# Patient Record
(demographics unavailable — no encounter records)

---

## 2024-10-16 NOTE — HISTORY OF PRESENT ILLNESS
[FreeTextEntry1] : I had the pleasure of seeing your patient Anders Yee today in the arrhythmia clinic of [facility]. As you well know, the patient is a delightful 76 year old [place] speaking gentleman with a history of obesity, chronic obstructive pulmonary disease, hypertension, hyperlipidemia, diabetes, sleep apnea. He had an underlying right bundle branch block with left axis, and had a syncopal episode. He underwent a dual chamber [employer] pacemaker on June 16, 2017 at [place]. He also has a history of paroxysmal atrial fibrillation. On his previous device checks, the patient has been found to have a low burden of atrial fibrillation usually around 5-7%, however some of these episodes last longer with average ventricular rates about 160 bpm. The patient has reported intermittent palpitations as well as shortness of breath when having his palpitations. He also reports occasional lightheadedness as well but denies any syncope. He is currently on metoprolol 100mg daily as well as Eliquis 5mg twice daily. He had been on Coumadin in the past but states he was switched to Eliquis.  A pharmacological stress test performed January 2020 showed a large mild to moderate defect in the inferior wall, inferoseptum and apex that is predominantly fixed consistent with infarct and an LVEF 43%. Nuclear stress test performed on March 8, 2022 demonstrated an EF of 56% and medium sized, mild to moderate defects in inferior, inferoseptal, and inferoapical walls that are fixed consistent with infarct.  He underwent an AF ablation (pulmonary vein isolation and CTI ablation) on July 14, 2021. He is maintained on Toprol and Eliquis. Since then, he reports continued shortness of breath and was diagnosed with COPD by a pulmonologist. He is an active smoker. He has made many attempts to try and quit smoking including nicotine patches and lozenges. Since last follow up March 2024, he has been followed up by pulmonology for his shortness of breath, which he reports has improved a bit, along with being diagnosed with chronic vestibular vertigo. He does report his dizziness has improved.  Today in clinic, Mr. Yee was in good spirits with minimal complaints.  He denies shortness of breath, palpitations, near-syncope or syncope. His blood pressure today is 120/78 with a pulse of 81 bpm and regular. His device interrogation shows normal device function. The atrial lead impedance is 440 ohms with a p wave amplitude of 1.6 mV and a threshold of 0.5 V @ 0.4ms. The RV lead impedance is 550 ohms with a threshold of 0.5V @ 0.4ms and an R wave amplitude of greater than 12 mV. The battery longevity is 3.3-3.5 years. He is V-paced 33% and A-paced 1.8% since June 12, 2023.  His ECG today shows sinus rhythm with ventricular pacing at 81 bpm.

## 2024-10-16 NOTE — DISCUSSION/SUMMARY
[FreeTextEntry1] : In summary, Mr. Yee is a pleasant 77 year-old Citizen of the [place] speaking gentleman with a history of obesity, chronic obstructive pulmonary disease, hypertension, hyperlipidemia, diabetes, sleep apnea, and tachy-geni syndrome status post pacemaker placement. He has paroxysmal atrial fibrillation and underwent a PVI/CTI ablation on July 14, 2021. He remains in sinus rhythm since then with very infrequent brief runs of atrial arrhythmias (8.3% burden since June 2023). He is maintained on Eliquis and metoprolol. We reassured the patient that his symptoms he has been experiencing are not secondary to his pacemaker or arrhythmias. He will follow up in clinic in 6 months. The pacemaker interrogation and ECG were distinct procedures  -Continue Apixaban 5 mg BID -Continue Metoprolol Succinate 50 mg Daily -Follow up in clinic in 6 months  I, Dr. Ponce, personally performed the evaluation and management (E/M) services for this established patient who presents today with his existing conditions. That E/M includes conducting the examination, assessing all existing conditions, and establishing a new plan of care. Today, my fellow, Bob Harrell MD, was here to observe my evaluation and management services for his existing condition to be followed going forward.   [EKG obtained to assist in diagnosis and management of assessed problem(s)] : EKG obtained to assist in diagnosis and management of assessed problem(s)

## 2024-10-16 NOTE — DISCUSSION/SUMMARY
[FreeTextEntry1] : In summary, Mr. Yee is a pleasant 77 year-old [place]ib[place] speaking gentleman with a history of obesity, chronic obstructive pulmonary disease, hypertension, hyperlipidemia, diabetes, sleep apnea, and tachy-geni syndrome status post pacemaker placement. He has paroxysmal atrial fibrillation and underwent a PVI/CTI ablation on July 14, 2021. He remains in sinus rhythm since then with very infrequent brief runs of atrial arrhythmias (8.3% burden since June 2023). He is maintained on Eliquis and metoprolol. We reassured the patient that his symptoms he has been experiencing are not secondary to his pacemaker or arrhythmias. He will follow up in clinic in 6 months. The pacemaker interrogation and ECG were distinct procedures  -Continue Apixaban 5 mg BID -Continue Metoprolol Succinate 50 mg Daily -Follow up in clinic in 6 months  I, Dr. Ponce, personally performed the evaluation and management (E/M) services for this established patient who presents today with his existing conditions. That E/M includes conducting the examination, assessing all existing conditions, and establishing a new plan of care. Today, my fellow, Bob Harrell MD, was here to observe my evaluation and management services for his existing condition to be followed going forward.   [EKG obtained to assist in diagnosis and management of assessed problem(s)] : EKG obtained to assist in diagnosis and management of assessed problem(s)

## 2024-10-16 NOTE — DISCUSSION/SUMMARY
[FreeTextEntry1] : In summary, Mr. Yee is a pleasant 77 year-old Filipino speaking gentleman with a history of obesity, chronic obstructive pulmonary disease, hypertension, hyperlipidemia, diabetes, sleep apnea, and tachy-geni syndrome status post pacemaker placement. He has paroxysmal atrial fibrillation and underwent a PVI/CTI ablation on July 14, 2021. He remains in sinus rhythm since then with very infrequent brief runs of atrial arrhythmias (8.3% burden since June 2023). He is maintained on Eliquis and metoprolol. We reassured the patient that his symptoms he has been experiencing are not secondary to his pacemaker or arrhythmias. He will follow up in clinic in 6 months. The pacemaker interrogation and ECG were distinct procedures  -Continue Apixaban 5 mg BID -Continue Metoprolol Succinate 50 mg Daily -Follow up in clinic in 6 months  I, Dr. Ponce, personally performed the evaluation and management (E/M) services for this established patient who presents today with his existing conditions. That E/M includes conducting the examination, assessing all existing conditions, and establishing a new plan of care. Today, my fellow, Bob Harrell MD, was here to observe my evaluation and management services for his existing condition to be followed going forward.   [EKG obtained to assist in diagnosis and management of assessed problem(s)] : EKG obtained to assist in diagnosis and management of assessed problem(s)

## 2024-10-16 NOTE — HISTORY OF PRESENT ILLNESS
[FreeTextEntry1] : I had the pleasure of seeing your patient Anders Yee today in the arrhythmia clinic of [facility]. As you well know, the patient is a delightful 76 year old Marshallese speaking gentleman with a history of obesity, chronic obstructive pulmonary disease, hypertension, hyperlipidemia, diabetes, sleep apnea. He had an underlying right bundle branch block with left axis, and had a syncopal episode. He underwent a dual chamber [employer] pacemaker on June 16, 2017 at [place]. He also has a history of paroxysmal atrial fibrillation. On his previous device checks, the patient has been found to have a low burden of atrial fibrillation usually around 5-7%, however some of these episodes last longer with average ventricular rates about 160 bpm. The patient has reported intermittent palpitations as well as shortness of breath when having his palpitations. He also reports occasional lightheadedness as well but denies any syncope. He is currently on metoprolol 100mg daily as well as Eliquis 5mg twice daily. He had been on Coumadin in the past but states he was switched to Eliquis.  A pharmacological stress test performed January 2020 showed a large mild to moderate defect in the inferior wall, inferoseptum and apex that is predominantly fixed consistent with infarct and an LVEF 43%. Nuclear stress test performed on March 8, 2022 demonstrated an EF of 56% and medium sized, mild to moderate defects in inferior, inferoseptal, and inferoapical walls that are fixed consistent with infarct.  He underwent an AF ablation (pulmonary vein isolation and CTI ablation) on July 14, 2021. He is maintained on Toprol and Eliquis. Since then, he reports continued shortness of breath and was diagnosed with COPD by a pulmonologist. He is an active smoker. He has made many attempts to try and quit smoking including nicotine patches and lozenges. Since last follow up March 2024, he has been followed up by pulmonology for his shortness of breath, which he reports has improved a bit, along with being diagnosed with chronic vestibular vertigo. He does report his dizziness has improved.  Today in clinic, Mr. Yee was in good spirits with minimal complaints.  He denies shortness of breath, palpitations, near-syncope or syncope. His blood pressure today is 120/78 with a pulse of 81 bpm and regular. His device interrogation shows normal device function. The atrial lead impedance is 440 ohms with a p wave amplitude of 1.6 mV and a threshold of 0.5 V @ 0.4ms. The RV lead impedance is 550 ohms with a threshold of 0.5V @ 0.4ms and an R wave amplitude of greater than 12 mV. The battery longevity is 3.3-3.5 years. He is V-paced 33% and A-paced 1.8% since June 12, 2023.  His ECG today shows sinus rhythm with ventricular pacing at 81 bpm.

## 2024-10-16 NOTE — HISTORY OF PRESENT ILLNESS
[FreeTextEntry1] : I had the pleasure of seeing your patient Anders Yee today in the arrhythmia clinic of [facility]. As you well know, the patient is a delightful 76 year old Monegasque speaking gentleman with a history of obesity, chronic obstructive pulmonary disease, hypertension, hyperlipidemia, diabetes, sleep apnea. He had an underlying right bundle branch block with left axis, and had a syncopal episode. He underwent a dual chamber [employer]em pacemaker on June 16, 2017 at [place]. He also has a history of paroxysmal atrial fibrillation. On his previous device checks, the patient has been found to have a low burden of atrial fibrillation usually around 5-7%, however some of these episodes last longer with average ventricular rates about 160 bpm. The patient has reported intermittent palpitations as well as shortness of breath when having his palpitations. He also reports occasional lightheadedness as well but denies any syncope. He is currently on metoprolol 100mg daily as well as Eliquis 5mg twice daily. He had been on Coumadin in the past but states he was switched to Eliquis.  A pharmacological stress test performed January 2020 showed a large mild to moderate defect in the inferior wall, inferoseptum and apex that is predominantly fixed consistent with infarct and an LVEF 43%. Nuclear stress test performed on March 8, 2022 demonstrated an EF of 56% and medium sized, mild to moderate defects in inferior, inferoseptal, and inferoapical walls that are fixed consistent with infarct.  He underwent an AF ablation (pulmonary vein isolation and CTI ablation) on July 14, 2021. He is maintained on Toprol and Eliquis. Since then, he reports continued shortness of breath and was diagnosed with COPD by a pulmonologist. He is an active smoker. He has made many attempts to try and quit smoking including nicotine patches and lozenges. Since last follow up March 2024, he has been followed up by pulmonology for his shortness of breath, which he reports has improved a bit, along with being diagnosed with chronic vestibular vertigo. He does report his dizziness has improved.  Today in clinic, Mr. Yee was in good spirits with minimal complaints.  He denies shortness of breath, palpitations, near-syncope or syncope. His blood pressure today is 120/78 with a pulse of 81 bpm and regular. His device interrogation shows normal device function. The atrial lead impedance is 440 ohms with a p wave amplitude of 1.6 mV and a threshold of 0.5 V @ 0.4ms. The RV lead impedance is 550 ohms with a threshold of 0.5V @ 0.4ms and an R wave amplitude of greater than 12 mV. The battery longevity is 3.3-3.5 years. He is V-paced 33% and A-paced 1.8% since June 12, 2023.  His ECG today shows sinus rhythm with ventricular pacing at 81 bpm.

## 2024-10-16 NOTE — DISCUSSION/SUMMARY
[FreeTextEntry1] : In summary, Mr. Yee is a pleasant 77 year-old [place]ian speaking gentleman with a history of obesity, chronic obstructive pulmonary disease, hypertension, hyperlipidemia, diabetes, sleep apnea, and tachy-geni syndrome status post pacemaker placement. He has paroxysmal atrial fibrillation and underwent a PVI/CTI ablation on July 14, 2021. He remains in sinus rhythm since then with very infrequent brief runs of atrial arrhythmias (8.3% burden since June 2023). He is maintained on Eliquis and metoprolol. We reassured the patient that his symptoms he has been experiencing are not secondary to his pacemaker or arrhythmias. He will follow up in clinic in 6 months. The pacemaker interrogation and ECG were distinct procedures  -Continue Apixaban 5 mg BID -Continue Metoprolol Succinate 50 mg Daily -Follow up in clinic in 6 months  I, Dr. Ponce, personally performed the evaluation and management (E/M) services for this established patient who presents today with his existing conditions. That E/M includes conducting the examination, assessing all existing conditions, and establishing a new plan of care. Today, my fellow, Bob Harrell MD, was here to observe my evaluation and management services for his existing condition to be followed going forward.   [EKG obtained to assist in diagnosis and management of assessed problem(s)] : EKG obtained to assist in diagnosis and management of assessed problem(s)

## 2024-10-16 NOTE — HISTORY OF PRESENT ILLNESS
[FreeTextEntry1] : I had the pleasure of seeing your patient Anders Yee today in the arrhythmia clinic of [facility]. As you well know, the patient is a delightful 76 year old Chinese speaking gentleman with a history of obesity, chronic obstructive pulmonary disease, hypertension, hyperlipidemia, diabetes, sleep apnea. He had an underlying right bundle branch block with left axis, and had a syncopal episode. He underwent a dual chamber [employer]em pacemaker on June 16, 2017 at [place]. He also has a history of paroxysmal atrial fibrillation. On his previous device checks, the patient has been found to have a low burden of atrial fibrillation usually around 5-7%, however some of these episodes last longer with average ventricular rates about 160 bpm. The patient has reported intermittent palpitations as well as shortness of breath when having his palpitations. He also reports occasional lightheadedness as well but denies any syncope. He is currently on metoprolol 100mg daily as well as Eliquis 5mg twice daily. He had been on Coumadin in the past but states he was switched to Eliquis.  A pharmacological stress test performed January 2020 showed a large mild to moderate defect in the inferior wall, inferoseptum and apex that is predominantly fixed consistent with infarct and an LVEF 43%. Nuclear stress test performed on March 8, 2022 demonstrated an EF of 56% and medium sized, mild to moderate defects in inferior, inferoseptal, and inferoapical walls that are fixed consistent with infarct.  He underwent an AF ablation (pulmonary vein isolation and CTI ablation) on July 14, 2021. He is maintained on Toprol and Eliquis. Since then, he reports continued shortness of breath and was diagnosed with COPD by a pulmonologist. He is an active smoker. He has made many attempts to try and quit smoking including nicotine patches and lozenges. Since last follow up March 2024, he has been followed up by pulmonology for his shortness of breath, which he reports has improved a bit, along with being diagnosed with chronic vestibular vertigo. He does report his dizziness has improved.  Today in clinic, Mr. Yee was in good spirits with minimal complaints.  He denies shortness of breath, palpitations, near-syncope or syncope. His blood pressure today is 120/78 with a pulse of 81 bpm and regular. His device interrogation shows normal device function. The atrial lead impedance is 440 ohms with a p wave amplitude of 1.6 mV and a threshold of 0.5 V @ 0.4ms. The RV lead impedance is 550 ohms with a threshold of 0.5V @ 0.4ms and an R wave amplitude of greater than 12 mV. The battery longevity is 3.3-3.5 years. He is V-paced 33% and A-paced 1.8% since June 12, 2023.  His ECG today shows sinus rhythm with ventricular pacing at 81 bpm.

## 2024-11-05 NOTE — DISCUSSION/SUMMARY
[EKG obtained to assist in diagnosis and management of assessed problem(s)] : EKG obtained to assist in diagnosis and management of assessed problem(s) [FreeTextEntry1] : The patient is a 77-year-old Bulgarian speaking gentleman DM, HTN, HLD,  A-fib PPM, COPD s/p afib ablation with now low BP #1 CV- pharm stress infarct, no ischemia #2 HTN- c/w losartan 100mg, off HCTZ secondary to dizziness, decrease amlodipine to 5mg and if still low then stop altogether, most likely reactive htn at dental surgery.  #3 A-fib- c/w metoprolol 100mg and Eliquis , no evidence atrial tachycardia, normal functioning, s/p afib ablation 7/21 Dr. Ponce, interrogation negative for significant AT today #4 HLD- c/w atorvastatin #5 DM- c/w metformin #6 Pulm- COPD, now on nicoderm patch #7 - s/p cystoscopy, on myrbetriq #8 Dental- There are no cardiac contraindications to dental work under general anesthesia off Eliquis 3 days prior.

## 2024-11-05 NOTE — HISTORY OF PRESENT ILLNESS
[FreeTextEntry1] : Anders is feeling well. When takes HCTZ daily gets dizzy so stopped it. Went for dental work but cancelled because of elevated BP. Today low. Going back on 11/22/24 and needs clearance.

## 2024-11-05 NOTE — DISCUSSION/SUMMARY
[EKG obtained to assist in diagnosis and management of assessed problem(s)] : EKG obtained to assist in diagnosis and management of assessed problem(s) [FreeTextEntry1] : The patient is a 77-year-old Portuguese speaking gentleman DM, HTN, HLD,  A-fib PPM, COPD s/p afib ablation with now low BP #1 CV- pharm stress infarct, no ischemia #2 HTN- c/w losartan 100mg, off HCTZ secondary to dizziness, decrease amlodipine to 5mg and if still low then stop altogether, most likely reactive htn at dental surgery.  #3 A-fib- c/w metoprolol 100mg and Eliquis , no evidence atrial tachycardia, normal functioning, s/p afib ablation 7/21 Dr. Ponce, interrogation negative for significant AT today #4 HLD- c/w atorvastatin #5 DM- c/w metformin #6 Pulm- COPD, now on nicoderm patch #7 - s/p cystoscopy, on myrbetriq #8 Dental- There are no cardiac contraindications to dental work under general anesthesia off Eliquis 3 days prior.

## 2024-11-10 NOTE — HISTORY OF PRESENT ILLNESS
[Atrial Fibrillation] : atrial fibrillation [Coronary Artery Disease] : coronary artery disease [Implantable Device/Pacemaker] : implantable device/pacemaker [Chronic Anticoagulation] : chronic anticoagulation [Diabetes] : diabetes [Aortic Stenosis] : aortic stenosis [COPD] : COPD [Smoker] : smoker [Poor (<4 METs)] : Poor (<4 METs) [Recent Myocardial Infarction] : no recent myocardial infarction [Asthma] : no asthma [Sleep Apnea] : no sleep apnea [Family Member] : no family member with adverse anesthesia reaction/sudden death [Self] : no previous adverse anesthesia reaction [Chronic Kidney Disease] : no chronic kidney disease [FreeTextEntry1] : Dental Extraction [FreeTextEntry4] :  name and ID: Nirmal Puente [id number]  Anders is a 77M with PMH A-fib (on Eliquis), BPH, Chronic Vestibular Vertigo, COPD (Trelegy daily), Depression, DM, CAD, HTN, HLD, PHIL, Obesity here for medical optimization for a dental procedure. He is planning to have all his teeth extracted due to deep infections. He went to the dentist recently for this procedure and was turned away as his blood pressure was above the systolic goal of 130. He has since got clearance from cardiology and needs medical optimization.   Medication list: eliquis 5 BID, metop succinate 50 daily, losar 100, amlodipine 5. aspirin 81, jardiance 10, basaglar 16, metformin 1000 BID, atorvastatin 40, protonix 40, chloestyramine, mybertiq 25, solifenacin 5, trelegy, hydrocortisone oint, lidocaine, gabapentin, methocarbamol prn   [FreeTextEntry6] : Dyspnea on exertion positive. No orthopnea, chest pain, claudication.   [FreeTextEntry7] : EKG Echo

## 2024-11-10 NOTE — INTERPRETER SERVICES
[Time Spent: ____ minutes] : Total time spent using  services: [unfilled] minutes. The patient's primary language is not English thus required  services. [Interpreters_IDNumber] : [id number] [Interpreters_FullName] : Nirmal Puente [TWNoteComboBox1] : Palestinian

## 2024-11-10 NOTE — PHYSICAL EXAM
[No Respiratory Distress] : no respiratory distress  [No Accessory Muscle Use] : no accessory muscle use [Clear to Auscultation] : lungs were clear to auscultation bilaterally [Normal S1, S2] : normal S1 and S2 [No Murmur] : no murmur heard [No Edema] : there was no peripheral edema [Soft] : abdomen soft [Non Tender] : non-tender [Non-distended] : non-distended [Normal] : affect was normal and insight and judgment were intact

## 2024-11-10 NOTE — ASSESSMENT
[High Risk Surgery - Intraperitoneal, Intrathoracic or Supringuinal Vascular Procedures] : High Risk Surgery - Intraperitoneal, Intrathoracic or Supringuinal Vascular Procedures - No (0) [Ischemic Heart Disease] : Ischemic Heart Disease  - Yes (1) [Prior Cerebrovascular Accident or TIA] : Prior Cerebrovascular Accident or TIA - No (0) [Insulin-dependent Diabetic (1 point)] : Insulin-dependent Diabetic - Yes (1) [Creatinine >= 2mg/dL (1 Point)] : Creatinine >= 2mg/dL - No (0) [Patient Optimized for Surgery] : Patient optimized for surgery [FreeTextEntry7] : Listed in pre-operative Assessment. On day of surgery, take losartan, amlo 5, aspirin, pantroprazole, mybertiq. Holding jardiance 4 days. Holding eliquis 3 days. Basaglar 12 night before. Hold metformin day of.  [FreeTextEntry4] : Anders is a 77M with PMH A-fib (on Eliquis), BPH, Chronic Vestibular Vertigo, COPD (Trelegy daily), Depression, DM, CAD, HTN, HLD, PHIL, Obesity here for medical optimization for a dental procedure.   Labs and EKG reviewed.   Per dental team and cardiology patient holding eliquis 3 days prior to procedure, taking 12 units of basaglar the night before surgery, and holding metformin the morning of surgery. Patient cleared by cardiology and pulmonology for dental extractions.

## 2024-11-10 NOTE — HISTORY OF PRESENT ILLNESS
[Atrial Fibrillation] : atrial fibrillation [Coronary Artery Disease] : coronary artery disease [Implantable Device/Pacemaker] : implantable device/pacemaker [Chronic Anticoagulation] : chronic anticoagulation [Diabetes] : diabetes [Aortic Stenosis] : aortic stenosis [COPD] : COPD [Smoker] : smoker [Poor (<4 METs)] : Poor (<4 METs) [Recent Myocardial Infarction] : no recent myocardial infarction [Asthma] : no asthma [Sleep Apnea] : no sleep apnea [Family Member] : no family member with adverse anesthesia reaction/sudden death [Self] : no previous adverse anesthesia reaction [Chronic Kidney Disease] : no chronic kidney disease [FreeTextEntry1] : Dental Extraction [FreeTextEntry4] :  name and ID: Nirmal Puente [id number]  Anders is a 77M with PMH A-fib (on Eliquis), BPH, Chronic Vestibular Vertigo, COPD (Trelegy daily), Depression, DM, CAD, HTN, HLD, PHIL, Obesity here for medical optimization for a dental procedure. He is planning to have all his teeth extracted due to deep infections. He went to the dentist recently for this procedure and was turned away as his blood pressure was above the systolic goal of 130. He has since got clearance from cardiology and needs medical optimization.   Medication list: eliquis 5 BID, metop succinate 50 daily, losar 100, amlodipine 5. aspirin 81, jardiance 10, basaglar 16, metformin 1000 BID, atorvastatin 40, protonix 40, chloestyramine, mybertiq 25, solifenacin 5, trelegy, hydrocortisone oint, lidocaine, gabapentin, methocarbamol prn   [FreeTextEntry6] : Dyspnea on exertion positive. No orthopnea, chest pain, claudication.   [FreeTextEntry7] : EKG Echo

## 2024-11-10 NOTE — END OF VISIT
[Time Spent: ___ minutes] : I have spent [unfilled] minutes of time on the encounter which excludes teaching and separately reported services. [] : Resident [FreeTextEntry3] : Patient with risk factors but cleared by cardiology and pulmonology. EKG and labs reviewed.

## 2024-11-10 NOTE — INTERPRETER SERVICES
[Time Spent: ____ minutes] : Total time spent using  services: [unfilled] minutes. The patient's primary language is not English thus required  services. [Interpreters_IDNumber] : [id number] [Interpreters_FullName] : Nirmal Puente [TWNoteComboBox1] : Chinese

## 2024-11-10 NOTE — INTERPRETER SERVICES
[Time Spent: ____ minutes] : Total time spent using  services: [unfilled] minutes. The patient's primary language is not English thus required  services. [Interpreters_IDNumber] : [id number] [Interpreters_FullName] : Nirmal Puente [TWNoteComboBox1] : Nepalese

## 2024-11-10 NOTE — HISTORY OF PRESENT ILLNESS
[Atrial Fibrillation] : atrial fibrillation [Coronary Artery Disease] : coronary artery disease [Implantable Device/Pacemaker] : implantable device/pacemaker [Chronic Anticoagulation] : chronic anticoagulation [Diabetes] : diabetes [Aortic Stenosis] : aortic stenosis [COPD] : COPD [Smoker] : smoker [Poor (<4 METs)] : Poor (<4 METs) [Recent Myocardial Infarction] : no recent myocardial infarction [Asthma] : no asthma [Sleep Apnea] : no sleep apnea [Family Member] : no family member with adverse anesthesia reaction/sudden death [Self] : no previous adverse anesthesia reaction [Chronic Kidney Disease] : no chronic kidney disease [FreeTextEntry1] : Dental Extraction [FreeTextEntry4] :  name and ID: Nirmal Puente [id number]  Anders is a 77M with PMH A-fib (on Eliquis), BPH, Chronic Vestibular Vertigo, COPD (Trelegy daily), Depression, DM, CAD, HTN, HLD, PHIL, Obesity here for medical optimization for a dental procedure. He is planning to have all his teeth extracted due to deep infections. He went to the dentist recently for this procedure and was turned away as his blood pressure was above the systolic goal of 130. He has since got clearance from cardiology and needs medical optimization.   Medication list: eliquis 5 BID, metop succinate 50 daily, losar 100, amlodipine 5. aspirin 81, jardiance 10, basaglar 16, metformin 1000 BID, atorvastatin 40, protonix 40, chloestyramine, mybertiq 25, solifenacin 5, trelegy, hydrocortisone oint, lidocaine, gabapentin, methocarbamol prn   [FreeTextEntry6] : Dyspnea on exertion positive. No orthopnea, chest pain, claudication.   [FreeTextEntry7] : EKG Echo

## 2024-11-20 NOTE — HISTORY OF PRESENT ILLNESS
[FreeTextEntry1] : DYLON AMARAL is a 76 year old male with a history of Afib s/p ablation, HTN, DM2 c/b peripheral neuropathy, COPD (active smoker), PHIL, HLD, tachy-geni syndrome s/p PPM  He is presenting today for  followup  He is pending dental extraction for deep infections this Friday   Prior history: Colon 12/21/23 - pan colonic diverticulosis, 3 small TAs resected, otherwise normal biopsies EGD/EUS 12/21/23 - mild esophagitis, gastritis, duodenal ulcerations, path - no HP, chronic active carditis no IM distal esoph; EUS with lobular pancreas suggestive of chronic panc He was recommended to take Xifaxan for IBS-D x 2 weeks and to try cholestyramine subsequently if did not help, as well as PRN hyoscyamine and Imodium  Nothing is helping, he says his sx of diarrhea/constipation/straining are even worse than before as well as bloating and abdominal pain Had episodes of BRBPR 2 weeks ago, about a cup, with burning anal pain Also having some fecal urgency/incontinence after BM States he was told he probably has Crohn's disease despite many negative tests from a previous doctor Also having burning reflux in throat despite BID PPI  He feels he needs another colonoscopy and endoscopy for his persistent symptoms and rectal bleeding He states if he has any procedure he needs to be sedated but also needs to be "asleep" prior to propofol administration due to the burning - recounts problem in endoscopy unit with anesthesiology the first endoscopy attempt but the second one with Dr. Rodrigez was good (gave him something prior to propofol)  He is still smoking - cannot quite despite nicotine replacement therapy trials

## 2024-11-20 NOTE — ASSESSMENT
[FreeTextEntry1] : DYLON AMARAL is a 76 year old male with a history of Afib s/p ablation, HTN, DM2 c/b peripheral neuropathy, COPD (active smoker), PHIL, HLD, tachy-geni syndrome s/p PPM  #BRBPR/anal pain - colonoscopy last year with small polyps and diverticula only #Diarrhea/constipation/abd pain/bloatin - likely IBS-mixed, no evidence of Crohn's disease or other organic etiology of sx on workup to date # Reflux on PPI not improved  Recs: - will send for [facility] anorectal evaluation - history of hemorrhoidectomies, incontinence, pain - note he prefers procedures to be done sedated and has special requests about being "asleep" prior to propofol bc of burning in IV site - for IBS cannot give Elavil or Pamelor due to risk of falls and active dizziness symptoms  - will recheck labs, stool tests and a CT enterography to ensure no evidence of small bowel inflammation - Carafate slurry for reflux sx in addition to BID PPI - follow up with me in 4 months

## 2024-11-20 NOTE — PHYSICAL EXAM
[Alert] : alert [Normal Voice/Communication] : normal voice/communication [Obese (BMI >= 30)] : obese (BMI >= 30) [Sclera] : the sclera and conjunctiva were normal [Hearing Threshold Finger Rub Not [place]] : hearing was normal [Normal Lips/Gums] : the lips and gums were normal [Oropharynx] : the oropharynx was normal [Normal Appearance] : the appearance of the neck was normal [No Neck Mass] : no neck mass was observed [No Respiratory Distress] : no respiratory distress [No Acc Muscle Use] : no accessory muscle use [Respiration, Rhythm And Depth] : normal respiratory rhythm and effort [Abdomen Soft] : soft [Oriented To Time, Place, And Person] : oriented to person, place, and time [de-identified] : disheveled [de-identified] : pressured speech/ repetitive

## 2025-02-28 NOTE — HISTORY OF PRESENT ILLNESS
[Aortic Stenosis] : aortic stenosis [Atrial Fibrillation] : atrial fibrillation [Coronary Artery Disease] : coronary artery disease [Implantable Device/Pacemaker] : implantable device/pacemaker [COPD] : COPD [Smoker] : smoker [No Adverse Anesthesia Reaction] : no adverse anesthesia reaction in self or family member [Poor (<4 METs)] : Poor (<4 METs) [Anti-Platelet Agents: _____] : Anti-Platelet Agents: [unfilled] [Anticoagulants: _____] : Anticoagulants: [unfilled] [Spouse] : spouse [Recent Myocardial Infarction] : no recent myocardial infarction [Asthma] : no asthma [Sleep Apnea] : no sleep apnea [FreeTextEntry1] : Alveoplasty of his maxilla and mandible [FreeTextEntry2] : 03/28/25 [FreeTextEntry3] : Dr. Eldon Burnette [FreeTextEntry4] : Patient is a 76 yo M with a PMH of obesity, severe dental carries, HTN, HLD, CAD with prior stents in 2019, Afib (on Eliquis), PPM (tachy-geni syndrome), DM, BPH, pancreatitis, & COPD (denies use of supplemental oxygen use) coming in for [facility] for dental surgery scheduled on March 28th. Of note, patient had extraction of remaining maxillary and mandibular dentition (20 in total) at [facility] inpatient on 11/22/24. Hospital course at time was complicated by severe left-sided CP with LHC showing patent coronaries and patient discharged with outpatient follow ups. Today, patient completely asymptomatic and does not have any active complaints. Will see Cardiology for clearance on March 11, 2025.  Medications are as follow: Eliquis 5 mg BID, metoprolol succinate 50 mg daily, losartan 100 mg qd, amlodipine 5 mg qd. aspirin 81 mg qd, jardiance 10 mg qd, basaglar 16 U QHS, metformin 1000 mg BID, atorvastatin 40 mg QHS, protonix 40, chloestyramine 4 GM, mybertiq 25 mg qd, solifenacin 5 mg qd, trelegy, hydrocortisone ointment, lidocaine, gabapentin PRN, methocarbamol prn.   [FreeTextEntry5] : Counseled patient on smoking cessation permanently. [FreeTextEntry6] : Patient does endorse dyspnea on exertion that is intermittent, but does not happen much because he does not exert himself. Last episode of CUEVAS was 6-8 months ago. Otherwise, no CP, orthopnea, palpitations, or syncope. [FreeTextEntry7] : Limited TTE (11/22/24): 1. Left ventricular systolic function is moderately decreased with an ejection fraction visually estimated at 40 to 45 %.   2. Multiple segmental abnormalities exist. The entire apex is akinetic. The basal and mid inferior wall, mid anteroseptal segment, mid inferolateral segment, and mid anterolateral segment are hypokinetic. All remaining scored segments are normal.  3. No pericardial effusion seen.   4. Compared to the transthoracic echocardiogram performed on 3/7/2022, new Wall motion abnormalities. Findings were discussed with Dr. Roel Alas.  EKG (11/5/24): Ventricular paced rhthym [FreeTextEntry8] : CATHIE used

## 2025-02-28 NOTE — ASSESSMENT
[Patient Optimized for Surgery] : Patient optimized for surgery [ECG] : ECG [Modify anticoagulant treatment prior to procedure] : Modify anticoagulant treatment prior to procedure [As per surgery] : as per surgery [Continue anti-platelet treatment as is] : Continue current anti-platelet treatment [Modify medications prior to procedure] : Modify medications prior to procedure [High Risk Surgery - Intraperitoneal, Intrathoracic or Supringuinal Vascular Procedures] : High Risk Surgery - Intraperitoneal, Intrathoracic or Supringuinal Vascular Procedures - No (0) [Ischemic Heart Disease] : Ischemic Heart Disease  - Yes (1) [Congestive Heart Failure] : Congestive Heart Failure - No (0) [Prior Cerebrovascular Accident or TIA] : Prior Cerebrovascular Accident or TIA - No (0) [Insulin-dependent Diabetic (1 point)] : Insulin-dependent Diabetic - Yes (1) [Creatinine >= 2mg/dL (1 Point)] : Creatinine >= 2mg/dL - No (0) [2] : 2 , RCRI Class: III, Risk of Post-Op Cardiac Complications: 10.1%, 95% CI for Risk Estimate: 8.1% - 12.6% [FreeTextEntry4] : Anders is a 77M with PMH A-fib (on Eliquis), BPH, Chronic Vestibular Vertigo, COPD (not on home O2), Depression, DM, CAD, HTN, HLD, PHIL, Obesity here for medical optimization for alveoplasty of his maxilla and mandible at the end of March  #Pre Operative Risk Stratification -CBC & CMP ordered - Patient currently pending dental procedure - Poor functional capacity (<4 METs) per DASI - RCRI score of 2 ; Class III; Up to 10.1% risk of post-op cardiac complications. Moderate to high-risk given a low-risk procedure.  - No evidence of heart failure, life threatening arrhythmia or ACS. Medically optimized for his dental procedure pending EKG, labs, & Cardiac clearance. [FreeTextEntry5] : Hold Eliquis 3 days prior to the procedure [FreeTextEntry7] : Hold Metformin the morning of procedure. Hold Jardiance 3 days prior to the procedure. Only take 12 U Basaglar (instead of 16) the night before.

## 2025-02-28 NOTE — END OF VISIT
[] : Resident [FreeTextEntry3] : Patient is a 78 yo M with a PMH of obesity, severe dental carries, HTN, HLD, CAD with prior stents in 2019, Afib (on Eliquis), PPM (tachy-geni syndrome), DM, BPH, pancreatitis, & COPD (denies use of supplemental oxygen use) coming in for [facility] for dental surgery scheduled on March 28th.   METS <4, RCRI Class III conferring 10.1% risk of major cardiac event. EKG in office with ventricularly paced rhythm. Pending cardiology appointment for optimization.  From a medicine standpoint, patient high risk but medically optimized for low risk procedure. Will hold Eliquis & SGLT-2 for 3 days prior to procedure and hold metformin morning of procedure. Pending cardiology appointment.

## 2025-02-28 NOTE — INTERPRETER SERVICES
[Language Line ] : provided by Language Line   [Time Spent: ____ minutes] : Total time spent using  services: [unfilled] minutes. The patient's primary language is not English thus required  services. [Interpreters_IDNumber] : [id number] [Interpreters_FullName] : Mike [TWNoteComboBox1] : Barbadian

## 2025-02-28 NOTE — PHYSICAL EXAM
[Pedal Pulses Present] : the pedal pulses are present [No Edema] : there was no peripheral edema [Normal] : soft, non-tender, non-distended, no masses palpated, no HSM and normal bowel sounds [No CVA Tenderness] : no CVA  tenderness [No Spinal Tenderness] : no spinal tenderness [No Joint Swelling] : no joint swelling [Grossly Normal Strength/Tone] : grossly normal strength/tone [No Rash] : no rash [Coordination Grossly Intact] : coordination grossly intact [Normal Affect] : the affect was normal [Normal Insight/Judgement] : insight and judgment were intact

## 2025-03-06 NOTE — HISTORY OF PRESENT ILLNESS
[FreeTextEntry8] : 76 yo M with a PMH of obesity, severe dental carries, HTN, HLD, CAD with prior stents in 2019, Afib (on Eliquis), PPM (tachy-geni syndrome), DM, BPH, pancreatitis, & COPD (denies use of supplemental oxygen use)

## 2025-03-06 NOTE — ASSESSMENT
[FreeTextEntry1] : 78 yo M with a PMH of obesity, severe dental carries, HTN, HLD, CAD with prior stents in 2019, Afib (on Eliquis), PPM (tachy-geni syndrome), DM, BPH, pancreatitis, & COPD (denies use of supplemental oxygen use) coming in for acute visit for back pain and evaluation of Thrombocytopenia and acidosis.  #Back Pain  Gabapentin methocarbamol  #Acidosis Last BMP w/ Bicarb 17 recently w/ elevated anion gap - Repeat CMP - Will check lactate evaluate for lactic acidosis iso Metformin use - If lactate elevated will try lowering metformin dosing   #Mild Thrombocytopenia, - recent CBC w/ , no other cell lines affected and no prior hx of thrombocytopenia - not on medications that could cause thrombocytopenia. Possibly iso illness(?) - No indications of clots  -Recent illness? -Recent Vaccines? -Hospitalizations? W/ Heparin  -Clots?   - Will repeat CBC w/ Diff, if persistently low, will add on peripheral smear - If persistently low, may need referral to hematology for further workup.

## 2025-03-08 NOTE — PHYSICAL EXAM
[Well Developed] : well developed [No Acute Distress] : no acute distress [Well Nourished] : well nourished [Normal Conjunctiva] : normal conjunctiva [Normal Venous Pressure] : normal venous pressure [No Carotid Bruit] : no carotid bruit [Normal S1, S2] : normal S1, S2 [No Murmur] : no murmur [No Rub] : no rub [No Gallop] : no gallop [Clear Lung Fields] : clear lung fields [Good Air Entry] : good air entry [No Respiratory Distress] : no respiratory distress  [Soft] : abdomen soft [Non Tender] : non-tender [No Masses/organomegaly] : no masses/organomegaly [Normal Bowel Sounds] : normal bowel sounds [Normal Gait] : normal gait [No Edema] : no edema [No Cyanosis] : no cyanosis [No Clubbing] : no clubbing [No Varicosities] : no varicosities [No Rash] : no rash [Moves all extremities] : moves all extremities [No Skin Lesions] : no skin lesions [No Focal Deficits] : no focal deficits [Normal Speech] : normal speech [Alert and Oriented] : alert and oriented [Normal memory] : normal memory

## 2025-03-08 NOTE — PHYSICAL EXAM
I reviewed the patient's initial evaluation. Given that he had been doing fairly well at the time of his initial evaluation on the current combination of medications, I'm curious to see what may have triggered this change in anxiety in the morning. Please request the patient to follow-up at the soonest possible appointment and I will reevaluate his medications. Cannot really make appropriate medication changes without further evaluation.    Danilo Eric MD    [Well Developed] : well developed [No Acute Distress] : no acute distress [Well Nourished] : well nourished [Normal Conjunctiva] : normal conjunctiva [Normal Venous Pressure] : normal venous pressure [No Carotid Bruit] : no carotid bruit [Normal S1, S2] : normal S1, S2 [No Murmur] : no murmur [No Rub] : no rub [No Gallop] : no gallop [Clear Lung Fields] : clear lung fields [Good Air Entry] : good air entry [No Respiratory Distress] : no respiratory distress  [Non Tender] : non-tender [Soft] : abdomen soft [No Masses/organomegaly] : no masses/organomegaly [Normal Bowel Sounds] : normal bowel sounds [Normal Gait] : normal gait [No Edema] : no edema [No Cyanosis] : no cyanosis [No Clubbing] : no clubbing [No Varicosities] : no varicosities [No Rash] : no rash [No Skin Lesions] : no skin lesions [Moves all extremities] : moves all extremities [No Focal Deficits] : no focal deficits [Normal Speech] : normal speech [Alert and Oriented] : alert and oriented [Normal memory] : normal memory

## 2025-03-08 NOTE — REASON FOR VISIT
Addended by: MANNIE LEWIS on: 5/17/2022 12:00 PM     Modules accepted: Orders     [Arrhythmia/ECG Abnorrmalities] : arrhythmia/ECG abnormalities [Symptom and Test Evaluation] : symptom and test evaluation [Hypertension] : hypertension

## 2025-03-11 NOTE — DISCUSSION/SUMMARY
[FreeTextEntry1] : The patient is a 77-year-old Martiniquais speaking gentleman DM, HTN, HLD,  A-fib PPM, COPD s/p afib ablation requiring oral surgery otherwise asymptomatic. #1 CV- pharm stress infarct, no ischemia #2 HTN- c/w losartan 100mg, amlodipine 5mg #3 A-fib- c/w metoprolol 100mg and Eliquis , no evidence atrial tachycardia, normal functioning, s/p afib ablation 7/21 Dr. Ponce, interrogation negative for significant AT today #4 HLD- c/w atorvastatin #5 DM- c/w metformin #6 Pulm- COPD, now on nicoderm patch #7 - s/p cystoscopy, on myrbetriq #8 Dental- There are no cardiac contraindications to  Alveoplasty of maxilla and mandible under general anesthesia off Eliquis 3 days prior.  There are no cardiac contraindications to CMC arthoplasty and left carpal tunnel release off Eliquis 3 days prior.    [EKG obtained to assist in diagnosis and management of assessed problem(s)] : EKG obtained to assist in diagnosis and management of assessed problem(s)

## 2025-03-11 NOTE — DISCUSSION/SUMMARY
[FreeTextEntry1] : The patient is a 77-year-old Peruvian speaking gentleman DM, HTN, HLD,  A-fib PPM, COPD s/p afib ablation requiring oral surgery otherwise asymptomatic. #1 CV- pharm stress infarct, no ischemia #2 HTN- c/w losartan 100mg, amlodipine 5mg #3 A-fib- c/w metoprolol 100mg and Eliquis , no evidence atrial tachycardia, normal functioning, s/p afib ablation 7/21 Dr. Ponce, interrogation negative for significant AT today #4 HLD- c/w atorvastatin #5 DM- c/w metformin #6 Pulm- COPD, now on nicoderm patch #7 - s/p cystoscopy, on myrbetriq #8 Dental- There are no cardiac contraindications to  Alveoplasty of maxilla and mandible under general anesthesia off Eliquis 3 days prior.  There are no cardiac contraindications to CMC arthoplasty and left carpal tunnel release off Eliquis 3 days prior.    [EKG obtained to assist in diagnosis and management of assessed problem(s)] : EKG obtained to assist in diagnosis and management of assessed problem(s)

## 2025-03-11 NOTE — HISTORY OF PRESENT ILLNESS
[FreeTextEntry1] : 77-year-old French speaking gentleman DM, HTN, HLD, A-fib PPM, COPD s/p A-fib ablation who had low bp and we decreased amlodipine. Feels better now but needs surgery. No CP, palpitations, lightheadedness or dizziness.   Surgery: CMC arthoplasty and left carpal tunnel release Date: Pending Surgeon: Dr. Padilla Hoover  Surgery: Alveoplasty of maxilla and mandible Date: 3/28/25 Surgeon: Dr. Eldon Burnette

## 2025-03-11 NOTE — DISCUSSION/SUMMARY
[FreeTextEntry1] : The patient is a 77-year-old Ethiopian speaking gentleman DM, HTN, HLD,  A-fib PPM, COPD s/p afib ablation requiring oral surgery otherwise asymptomatic. #1 CV- pharm stress infarct, no ischemia #2 HTN- c/w losartan 100mg, amlodipine 5mg #3 A-fib- c/w metoprolol 100mg and Eliquis , no evidence atrial tachycardia, normal functioning, s/p afib ablation 7/21 Dr. Ponce, interrogation negative for significant AT today #4 HLD- c/w atorvastatin #5 DM- c/w metformin #6 Pulm- COPD, now on nicoderm patch #7 - s/p cystoscopy, on myrbetriq #8 Dental- There are no cardiac contraindications to  Alveoplasty of maxilla and mandible under general anesthesia off Eliquis 3 days prior.  There are no cardiac contraindications to CMC arthoplasty and left carpal tunnel release off Eliquis 3 days prior.    [EKG obtained to assist in diagnosis and management of assessed problem(s)] : EKG obtained to assist in diagnosis and management of assessed problem(s)

## 2025-03-11 NOTE — HISTORY OF PRESENT ILLNESS
[FreeTextEntry1] : 77-year-old Turkish speaking gentleman DM, HTN, HLD, A-fib PPM, COPD s/p A-fib ablation who had low bp and we decreased amlodipine. Feels better now but needs surgery. No CP, palpitations, lightheadedness or dizziness.   Surgery: CMC arthoplasty and left carpal tunnel release Date: Pending Surgeon: Dr. Padilla Hoover  Surgery: Alveoplasty of maxilla and mandible Date: 3/28/25 Surgeon: Dr. Eldon Burnette

## 2025-03-11 NOTE — HISTORY OF PRESENT ILLNESS
[FreeTextEntry1] : 77-year-old Thai speaking gentleman DM, HTN, HLD, A-fib PPM, COPD s/p A-fib ablation who had low bp and we decreased amlodipine. Feels better now but needs surgery. No CP, palpitations, lightheadedness or dizziness.   Surgery: CMC arthoplasty and left carpal tunnel release Date: Pending Surgeon: Dr. Padilla Hoover  Surgery: Alveoplasty of maxilla and mandible Date: 3/28/25 Surgeon: Dr. Eldon Burnette

## 2025-03-11 NOTE — HISTORY OF PRESENT ILLNESS
[FreeTextEntry1] : 77-year-old Chinese speaking gentleman DM, HTN, HLD, A-fib PPM, COPD s/p A-fib ablation who had low bp and we decreased amlodipine. Feels better now but needs surgery. No CP, palpitations, lightheadedness or dizziness.   Surgery: CMC arthoplasty and left carpal tunnel release Date: Pending Surgeon: Dr. Padilla Hoover  Surgery: Alveoplasty of maxilla and mandible Date: 3/28/25 Surgeon: Dr. Eldon Burnette

## 2025-03-11 NOTE — DISCUSSION/SUMMARY
[FreeTextEntry1] : The patient is a 77-year-old Hong Konger speaking gentleman DM, HTN, HLD,  A-fib PPM, COPD s/p afib ablation requiring oral surgery otherwise asymptomatic. #1 CV- pharm stress infarct, no ischemia #2 HTN- c/w losartan 100mg, amlodipine 5mg #3 A-fib- c/w metoprolol 100mg and Eliquis , no evidence atrial tachycardia, normal functioning, s/p afib ablation 7/21 Dr. Ponce, interrogation negative for significant AT today #4 HLD- c/w atorvastatin #5 DM- c/w metformin #6 Pulm- COPD, now on nicoderm patch #7 - s/p cystoscopy, on myrbetriq #8 Dental- There are no cardiac contraindications to  Alveoplasty of maxilla and mandible under general anesthesia off Eliquis 3 days prior.  There are no cardiac contraindications to CMC arthoplasty and left carpal tunnel release off Eliquis 3 days prior.    [EKG obtained to assist in diagnosis and management of assessed problem(s)] : EKG obtained to assist in diagnosis and management of assessed problem(s)

## 2025-03-11 NOTE — DISCUSSION/SUMMARY
[FreeTextEntry1] : The patient is a 77-year-old Turkmen speaking gentleman DM, HTN, HLD,  A-fib PPM, COPD s/p afib ablation requiring oral surgery otherwise asymptomatic. #1 CV- pharm stress infarct, no ischemia #2 HTN- c/w losartan 100mg, amlodipine 5mg #3 A-fib- c/w metoprolol 100mg and Eliquis , no evidence atrial tachycardia, normal functioning, s/p afib ablation 7/21 Dr. Ponce, interrogation negative for significant AT today #4 HLD- c/w atorvastatin #5 DM- c/w metformin #6 Pulm- COPD, now on nicoderm patch #7 - s/p cystoscopy, on myrbetriq #8 Dental- There are no cardiac contraindications to  Alveoplasty of maxilla and mandible under general anesthesia off Eliquis 3 days prior.  There are no cardiac contraindications to CMC arthoplasty and left carpal tunnel release off Eliquis 3 days prior.    [EKG obtained to assist in diagnosis and management of assessed problem(s)] : EKG obtained to assist in diagnosis and management of assessed problem(s)

## 2025-03-11 NOTE — HISTORY OF PRESENT ILLNESS
[FreeTextEntry1] : 77-year-old Amharic speaking gentleman DM, HTN, HLD, A-fib PPM, COPD s/p A-fib ablation who had low bp and we decreased amlodipine. Feels better now but needs surgery. No CP, palpitations, lightheadedness or dizziness.   Surgery: CMC arthoplasty and left carpal tunnel release Date: Pending Surgeon: Dr. Padilla Hoover  Surgery: Alveoplasty of maxilla and mandible Date: 3/28/25 Surgeon: Dr. Eldon Burnette

## 2025-03-18 NOTE — HISTORY OF PRESENT ILLNESS
[Aortic Stenosis] : aortic stenosis [Atrial Fibrillation] : atrial fibrillation [Coronary Artery Disease] : coronary artery disease [Implantable Device/Pacemaker] : implantable device/pacemaker [COPD] : COPD [Smoker] : smoker [Chronic Anticoagulation] : chronic anticoagulation [Diabetes] : diabetes [(Patient denies any chest pain, claudication, dyspnea on exertion, orthopnea, palpitations or syncope)] : Patient denies any chest pain, claudication, dyspnea on exertion, orthopnea, palpitations or syncope [Poor (<4 METs)] : Poor (<4 METs) [Anti-Platelet Agents: _____] : Anti-Platelet Agents: [unfilled] [Anticoagulants: _____] : Anticoagulants: [unfilled] [Spouse] : spouse [Recent Myocardial Infarction] : no recent myocardial infarction [Asthma] : no asthma [Sleep Apnea] : no sleep apnea [Family Member] : no family member with adverse anesthesia reaction/sudden death [Self] : no previous adverse anesthesia reaction [Chronic Kidney Disease] : no chronic kidney disease [FreeTextEntry1] : CMC arthroplasty and Carpal Tunnel Release Left  [FreeTextEntry2] : pending.  [FreeTextEntry3] : [facility] Surgical Specialist Dr. Padilla Hoover  [FreeTextEntry4] : Patient is a 78 yo M with a PMH of obesity, severe dental carries, HTN, HLD, CAD with prior stents in 2019, Afib (on Eliquis), PPM (tachy-geni syndrome), DM, BPH, pancreatitis, & COPD came for MCA for CMC arthroplasty & Carpal Tunnel Release Left.   Cardio clearance appt 3/11/25. EKG done 3/11/25.   [FreeTextEntry7] : TTE (11/22/24): 1. Left ventricular systolic function is moderately decreased with an ejection fraction visually estimated at 40 to 45 %. 2. Multiple segmental abnormalities exist. The entire apex is akinetic. The basal and mid inferior wall, mid anteroseptal segment, mid inferolateral segment, and mid anterolateral segment are hypokinetic. All remaining scored segments are normal. 3. No pericardial effusion seen. 4. Compared to the transthoracic echocardiogram performed on 3/7/2022, new Wall motion abnormalities.  EKG (3/11/24): Ventricular paced rhthym.

## 2025-03-18 NOTE — PHYSICAL EXAM
[No Acute Distress] : no acute distress [Well Developed] : well developed [Well-Appearing] : well-appearing [Normal Voice/Communication] : normal voice/communication [Normal Sclera/Conjunctiva] : normal sclera/conjunctiva [PERRL] : pupils equal round and reactive to light [EOMI] : extraocular movements intact [Normal Outer Ear/Nose] : the outer ears and nose were normal in appearance [Normal Oropharynx] : the oropharynx was normal [Normal TMs] : both tympanic membranes were normal [No Lymphadenopathy] : no lymphadenopathy [Supple] : supple [No Respiratory Distress] : no respiratory distress  [No Accessory Muscle Use] : no accessory muscle use [Clear to Auscultation] : lungs were clear to auscultation bilaterally [Normal Rate] : normal rate  [Regular Rhythm] : with a regular rhythm [Normal S1, S2] : normal S1 and S2 [No Murmur] : no murmur heard [Pedal Pulses Present] : the pedal pulses are present [No Edema] : there was no peripheral edema [Soft] : abdomen soft [Non Tender] : non-tender [Normal Bowel Sounds] : normal bowel sounds [No CVA Tenderness] : no CVA  tenderness [Grossly Normal Strength/Tone] : grossly normal strength/tone [Alert and Oriented x3] : oriented to person, place, and time [Normal Mood] : the mood was normal [Normal Insight/Judgement] : insight and judgment were intact [de-identified] : unsteady gait, ambulates with rolling walker.

## 2025-03-18 NOTE — ASSESSMENT
[High Risk Surgery - Intraperitoneal, Intrathoracic or Supringuinal Vascular Procedures] : High Risk Surgery - Intraperitoneal, Intrathoracic or Supringuinal Vascular Procedures - No (0) [Ischemic Heart Disease] : Ischemic Heart Disease  - Yes (1) [Congestive Heart Failure] : Congestive Heart Failure - No (0) [Prior Cerebrovascular Accident or TIA] : Prior Cerebrovascular Accident or TIA - No (0) [Insulin-dependent Diabetic (1 point)] : Insulin-dependent Diabetic - Yes (1) [Creatinine >= 2mg/dL (1 Point)] : Creatinine >= 2mg/dL - No (0) [3 - 6] : 3 - 6 , RCRI Class: IV, Risk of Post-Op Cardiac Complications: 15.0%, 95% CI for Risk Estimate: 11.1% - 20.0% [Patient Optimized for Surgery] : Patient optimized for surgery [No Further Testing Recommended] : no further testing recommended [Modify anticoagulant treatment prior to procedure] : Modify anticoagulant treatment prior to procedure [Continue anti-platelet treatment as is] : Continue current anti-platelet treatment [As per surgery] : as per surgery [FreeTextEntry4] : Patient is a 78 yo M with a PMH of obesity, severe dental carries, HTN, HLD, CAD with prior stents in 2019, Afib (on Eliquis), PPM (tachy-geni syndrome), DM, BPH, pancreatitis, & COPD came for MCA for CMC arthroplasty & Carpal Tunnel Release Left.  Preop - CBC and CMP done 3/6/2025, EKG done 3/11/25 at Cardio office.  - Chest Xray & PT/INR ordered today.  - Poor functional capacity (<4 METs)     [FreeTextEntry5] : Hold Eliquis 3 days prior to the procedure. [FreeTextEntry7] : Hold Metformin the morning of procedure. Hold Jardiance 3 days prior to the procedure. Only take 12 U Basaglar (instead of 16) the night before.

## 2025-03-18 NOTE — HISTORY OF PRESENT ILLNESS
[Aortic Stenosis] : aortic stenosis [Atrial Fibrillation] : atrial fibrillation [Coronary Artery Disease] : coronary artery disease [Implantable Device/Pacemaker] : implantable device/pacemaker [COPD] : COPD [Smoker] : smoker [Chronic Anticoagulation] : chronic anticoagulation [Diabetes] : diabetes [(Patient denies any chest pain, claudication, dyspnea on exertion, orthopnea, palpitations or syncope)] : Patient denies any chest pain, claudication, dyspnea on exertion, orthopnea, palpitations or syncope [Poor (<4 METs)] : Poor (<4 METs) [Anti-Platelet Agents: _____] : Anti-Platelet Agents: [unfilled] [Anticoagulants: _____] : Anticoagulants: [unfilled] [Spouse] : spouse [Recent Myocardial Infarction] : no recent myocardial infarction [Asthma] : no asthma [Sleep Apnea] : no sleep apnea [Family Member] : no family member with adverse anesthesia reaction/sudden death [Self] : no previous adverse anesthesia reaction [Chronic Kidney Disease] : no chronic kidney disease [FreeTextEntry1] : CMC arthroplasty and Carpal Tunnel Release Left  [FreeTextEntry2] : pending.  [FreeTextEntry3] : [facility] Surgical Specialist Dr. Padilla Hoover  [FreeTextEntry4] : Patient is a 78 yo M with a PMH of obesity, severe dental carries, HTN, HLD, CAD with prior stents in 2019, Afib (on Eliquis), PPM (tachy-geni syndrome), DM, BPH, pancreatitis, & COPD came for MCA for CMC arthroplasty & Carpal Tunnel Release Left.   Cardio clearance appt 3/11/25. EKG done 3/11/25.   [FreeTextEntry7] : TTE (11/22/24): 1. Left ventricular systolic function is moderately decreased with an ejection fraction visually estimated at 40 to 45 %. 2. Multiple segmental abnormalities exist. The entire apex is akinetic. The basal and mid inferior wall, mid anteroseptal segment, mid inferolateral segment, and mid anterolateral segment are hypokinetic. All remaining scored segments are normal. 3. No pericardial effusion seen. 4. Compared to the transthoracic echocardiogram performed on 3/7/2022, new Wall motion abnormalities.  EKG (3/11/24): Ventricular paced rhthym.

## 2025-03-18 NOTE — ASSESSMENT
[High Risk Surgery - Intraperitoneal, Intrathoracic or Supringuinal Vascular Procedures] : High Risk Surgery - Intraperitoneal, Intrathoracic or Supringuinal Vascular Procedures - No (0) [Ischemic Heart Disease] : Ischemic Heart Disease  - Yes (1) [Congestive Heart Failure] : Congestive Heart Failure - No (0) [Prior Cerebrovascular Accident or TIA] : Prior Cerebrovascular Accident or TIA - No (0) [Insulin-dependent Diabetic (1 point)] : Insulin-dependent Diabetic - Yes (1) [Creatinine >= 2mg/dL (1 Point)] : Creatinine >= 2mg/dL - No (0) [3 - 6] : 3 - 6 , RCRI Class: IV, Risk of Post-Op Cardiac Complications: 15.0%, 95% CI for Risk Estimate: 11.1% - 20.0% [Patient Optimized for Surgery] : Patient optimized for surgery [No Further Testing Recommended] : no further testing recommended [Modify anticoagulant treatment prior to procedure] : Modify anticoagulant treatment prior to procedure [Continue anti-platelet treatment as is] : Continue current anti-platelet treatment [As per surgery] : as per surgery [FreeTextEntry4] : Patient is a 76 yo M with a PMH of obesity, severe dental carries, HTN, HLD, CAD with prior stents in 2019, Afib (on Eliquis), PPM (tachy-geni syndrome), DM, BPH, pancreatitis, & COPD came for MCA for CMC arthroplasty & Carpal Tunnel Release Left.  Preop - CBC and CMP done 3/6/2025, EKG done 3/11/25 at Cardio office.  - Chest Xray & PT/INR ordered today.  - Poor functional capacity (<4 METs)     [FreeTextEntry5] : Hold Eliquis 3 days prior to the procedure. [FreeTextEntry7] : Hold Metformin the morning of procedure. Hold Jardiance 3 days prior to the procedure. Only take 12 U Basaglar (instead of 16) the night before.

## 2025-03-18 NOTE — INTERPRETER SERVICES
[Language Line ] : provided by Language Line   [Time Spent: ____ minutes] : Total time spent using  services: [unfilled] minutes. The patient's primary language is not English thus required  services. [Interpreters_IDNumber] : [id number] [Interpreters_FullName] : Navarro [TWNoteComboBox1] : Mauritian

## 2025-03-18 NOTE — PHYSICAL EXAM
[No Acute Distress] : no acute distress [Well Developed] : well developed [Well-Appearing] : well-appearing [Normal Voice/Communication] : normal voice/communication [Normal Sclera/Conjunctiva] : normal sclera/conjunctiva [PERRL] : pupils equal round and reactive to light [EOMI] : extraocular movements intact [Normal Outer Ear/Nose] : the outer ears and nose were normal in appearance [Normal Oropharynx] : the oropharynx was normal [Normal TMs] : both tympanic membranes were normal [No Lymphadenopathy] : no lymphadenopathy [Supple] : supple [No Respiratory Distress] : no respiratory distress  [No Accessory Muscle Use] : no accessory muscle use [Clear to Auscultation] : lungs were clear to auscultation bilaterally [Normal Rate] : normal rate  [Regular Rhythm] : with a regular rhythm [Normal S1, S2] : normal S1 and S2 [No Murmur] : no murmur heard [Pedal Pulses Present] : the pedal pulses are present [No Edema] : there was no peripheral edema [Soft] : abdomen soft [Non Tender] : non-tender [Normal Bowel Sounds] : normal bowel sounds [No CVA Tenderness] : no CVA  tenderness [Grossly Normal Strength/Tone] : grossly normal strength/tone [Alert and Oriented x3] : oriented to person, place, and time [Normal Mood] : the mood was normal [Normal Insight/Judgement] : insight and judgment were intact [de-identified] : unsteady gait, ambulates with rolling walker.

## 2025-03-18 NOTE — INTERPRETER SERVICES
[Language Line ] : provided by [employer]   [Time Spent: ____ minutes] : Total time spent using  services: [unfilled] minutes. The patient's primary language is not English thus required  services. [Interpreters_IDNumber] : [id number] [Interpreters_FullName] : Navarro [TWNoteComboBox1] : [place]

## 2025-03-18 NOTE — REVIEW OF SYSTEMS
[Fever] : no fever [Chills] : no chills [Fatigue] : no fatigue [Recent Change In Weight] : ~T no recent weight change [Nasal Discharge] : no nasal discharge [Sore Throat] : no sore throat [Hoarseness] : no hoarseness [Chest Pain] : no chest pain [Palpitations] : no palpitations [Lower Ext Edema] : no lower extremity edema [Shortness Of Breath] : no shortness of breath [Wheezing] : no wheezing [Cough] : no cough [Dyspnea on Exertion] : not dyspnea on exertion [Abdominal Pain] : no abdominal pain [Nausea] : no nausea [Vomiting] : no vomiting [Dysuria] : no dysuria [Hematuria] : no hematuria [Itching] : no itching [Skin Rash] : no skin rash [Headache] : no headache [Dizziness] : no dizziness [Fainting] : no fainting [Suicidal] : not suicidal [Insomnia] : no insomnia [Anxiety] : no anxiety [Depression] : no depression [FreeTextEntry9] : Left wrist pain

## 2025-05-07 NOTE — END OF VISIT
[] : Resident [FreeTextEntry3] : complicated hx with longstanding gait instability and episodic dizziness/verigo  ( multifactorial  by prior extensive workyp /assessment by neurology) ...addressed ambulation precautions and promoted return to PT as well as re assessment by neurology for continued sxs. anxiety/depressed moods noted in this enocunter...plan as outlined. nightly gabapentin and  referral ...consider SSRI

## 2025-05-07 NOTE — ASSESSMENT
[Vaccines Reviewed] : Immunizations reviewed today. Please see immunization details in the vaccine log within the immunization flowsheet.  [FreeTextEntry1] : Patient is a 78 yo M with a PMH of obesity, HTN, HLD, CAD with prior stents in 2019, Afib (on Eliquis), PPM (tachy-geni syndrome), DM, BPH, pancreatitis, COPD (denies use of supplemental oxygen) , and severe dental carries coming in for CPE feeling slightly depressed and with chronic vertigo.  #HCM  -Urine Alb/Cr today & POCT A1C (6.8) -UTD on vaccines (including Prevnar-20 & Shingrix). Will receive Tdap (Adacel) today. -Lung CA screening in Mar 2024--- Lung-RADS 2 (Benign Appearance) and requires annual screening so due for CT chest lung 05/2025. Given referral  -Colon CA screening UTD through 12/2028 (does not require anymore given age)  -US Abdomen (2020) without AAA  -Screening wise: Only Annual low dose CT chest  -PHQ-9  of 15 with very difficult functioning but no SI/HI. Wants to speak with Counselor but no medication today.  # HTN  C/w Norvasc 5 mg qd & Losartan 100 mg qd   #DM  C/w Basaglar 16 U, Jardiance 10 mg qd, and Metformin 1000 mg BID  C/w Lipitor 40 mg QHS  Urine Alb/Cr today & POCT A1C (6.8) F/u Ophthalmology and Podiatry yearly for annual check-up Shoe RX filled  #HLD  C/w Lipitor 40 mg QHS   CAD s/p stents (2019)  Follows with Cardiology.  C/w Metoprolol succinate 50 mg qd & ASA 81 mg qd   #AFib   C/w Eliquis 5 mg BID & metoprolol succinate 50 mg qd  F/u with Cardiology  #vestibulo-cerebellar vertigo + Dizziness Does not seem to be related to BP or BG (normotensive and normal BG during examinations in past) Possible polypharm but given chronicity and RFs, most likely small vessel disease causing deficits in lizzeth and cerebellum Neurology referral made  #MDD + Anxiety Counseling as above. Counseling (Dr. Shakira Oneil) notified.  Hold off on SSRI for now. Take Gabapentin 300 mg QHS  #Back Pain -Stop Baclofen and start Gabapentin 300 mg QHS (for back pain and can help with MDD) -PT referral made  RTC in 2 months for MDD check up

## 2025-05-07 NOTE — INTERPRETER SERVICES
[Language Line ] : provided by Language [employer]   [Time Spent: ____ minutes] : Total time spent using  services: [unfilled] minutes. The patient's primary language is not English thus required  services. [Interpreters_IDNumber] : [id number] [Interpreters_FullName] : Lori Darnell [TWNoteComboBox1] : Palestinian

## 2025-05-07 NOTE — HISTORY OF PRESENT ILLNESS
[FreeTextEntry1] : CPE [de-identified] : Patient is a 78 yo M with a PMH of obesity, HTN, HLD, CAD with prior stents in 2019, Afib (on Eliquis), PPM (tachy-geni syndrome), DM, BPH, pancreatitis, COPD (denies use of supplemental oxygen) , and severe dental carries that was last seen on 03/17 /25 for medical clearance/optimization for CMC arthroplasty & Carpal Tunnel Release on LUE. Patient complains of chronic dizziness & vertigo for 3-4 years that is constant and almost "never" remits. States got meclizine for it in the past but that did not help (actually made it worse). Also has to stop while walking because of these dizziness episodes. Of note, patient seen Neurology in 2021 for worsening ataxia (w/ fall) & MRI Head w/wo IV contrast at that time with no significant findings. Patient also went and saw ENT 06/2021 for hearing loss and vertigo and patient became dizzy without nystagmus during eval and sent to Dr. Christensen (Neurologist) for central vestibulopathy eval. CT scan of head and CT angio with no large vessel occlusion but patient hx of DM, smoking, heart disease, and HTN make him prone for possible small vessel ischemic disease of lizzeth and cerebellar producing vestibulo-cerebellar vertigo. During 2021 evaluations, patient was normotensive and euglycemic. Patient has not seen neurology in a while. Also indicates has a difficult time maintain posture (leans towards one side either way) and has electric sensation in b/l frontal area for few seconds intermittently for last few months (7-8 months). No recent fever, diarrhea, travels or sick contacts and only has chronic cough. Patient states SBP ranges from 98 to 210 (Saturday) and most recent in the clinic was 128/70. BG @ home ranges from 120-180s but mostly near 120 with POCT A1c of 6.8 today. PMHx: As above PSHx: As in chart Meds: Eliquis 5 mg BID, metoprolol succinate 50 mg daily, losartan 100 mg qd, amlodipine 5 mg qd. aspirin 81 mg qd, jardiance 10 mg qd, basaglar 16 U QHS, metformin 1000 mg BID, atorvastatin 40 mg QHS, protonix 40, chloestyramine 4 GM, mybertiq 25 mg qd, solifenacin 5 mg qd, trelegy, lidocaine, gabapentin 300 mg TID PRN, Baclofen 10 mg TID PRN for back pain Allergies: NKDA Vaccines: UTD except for Tdap Fam Hx; unchanged Social hx: Denies any drinking or drug use. Patient quit 4 days ago (nicotine patch on) and smoked 50 years for 1-3 PPD. No exercise and diet consist of any type of food (no specific diet) and patient agrees to see nutritionist (appt in 09/2025). Uses walker at baseline PHQ-9 = 15 with very difficulty functioning with no NO SI/HI. Feeling nervous right now. States that he feels depressed because of his health issues (cried during conversation) and wishes to see a counselor but hold off on medications.

## 2025-05-07 NOTE — PHYSICAL EXAM
[Obese] : patient was observed to be obese [Normal] : no joint swelling, grossly normal strength/tone [de-identified] : Nervous appearing [de-identified] : No edema or pain in b/l LEs [de-identified] : No FNDs in terms of cranial nerves. Normal finger to nose testing on b/l UEs. No muscle weakness or sensory loss. [de-identified] : Nervous but no SI/HI

## 2025-05-07 NOTE — HEALTH RISK ASSESSMENT
[Fair] :  ~his/her~ mood as fair [No] : In the past 12 months have you used drugs other than those required for medical reasons? No [No falls in past year] : Patient reported no falls in the past year [Not at All (0)] : 1.) Little interest or pleasure in doing things? Not at all [Nearly Every Day (3)] : 4.) Feeling tired or having little energy? Nearly every day [1/2 of Days or More (2)] : 7.) Trouble concentrating on things, such as reading a newspaper or watching television? Half the days or more [Several Days (1)] : 9.) Thoughts that you would be off dead or of hurting yourself in some way? Several days [Moderately Severe] : Severity of Depression is Moderately Severe [Very Difficult] : How difficult have these problems made it for you to do your work, take care of things at home, or get along with people? Very difficult [PHQ-9 Positive] : PHQ-9 Positive [Former] : Former [20 or more] : 20 or more [< 15 Years] : < 15 Years [2] : 2 [Patient reported colonoscopy was normal] : Patient reported colonoscopy was normal [Fully functional (bathing, dressing, toileting, transferring, walking, feeding)] : Fully functional (bathing, dressing, toileting, transferring, walking, feeding) [Fully functional (using the telephone, shopping, preparing meals, housekeeping, doing laundry, using] : Fully functional and needs no help or supervision to perform IADLs (using the telephone, shopping, preparing meals, housekeeping, doing laundry, using transportation, managing medications and managing finances) [[id number]] : 15 [de-identified] : Quit 4 days ago [LowDoseCTScan] : 2024 [ColonoscopyDate] : 2018

## 2025-06-02 NOTE — HISTORY OF PRESENT ILLNESS
[Current] : Current [TextBox_13] : Patient is scheduled for a annual  LDCT for lung cancer screening. Chart review performed to confirm eligibility for LDCT.    No documented personal or family history of lung cancer. No documented s/s of lung cancer. Pt is a current smoker with a 25 pack year hx. [PacksperYear] : 25

## 2025-07-17 NOTE — ASSESSMENT
[FreeTextEntry1] : Impression:  -Vertiginous symptoms reproducible with head impulse test, likely peripheral etiology. Unlikely due to small vessel disease given no remote infarct noted in the lizzeth or centers of the vestibularcochlear system. Previous MRI with periventricular hyperintensities likely chronic microvascular changes.   -Neck pain with electrical sensation with tenderness to light touch in posterior neck likely peripheral nerve dysfunction with a component of occipital neuralgia.   # Vertigo - MRI brain with IAC - Referral for vestibular therapy  # Neck pain - Referral for physical therapy - Adjusted gabapentin dosing to 100 mg TID for better coverage while avoiding lethargy   RTC in 4 to 6 weeks

## 2025-07-17 NOTE — PHYSICAL EXAM
[General Appearance - Alert] : alert [General Appearance - In No Acute Distress] : in no acute distress [Oriented To Time, Place, And Person] : oriented to person, place, and time [Impaired Insight] : insight and judgment were intact [Affect] : the affect was normal [Person] : oriented to person [Place] : oriented to place [Time] : oriented to time [Span Intact] : the attention span was normal [Concentration Intact] : normal concentrating ability [Visual Intact] : visual attention was ~T not ~L decreased [Cranial Nerves Optic (II)] : visual acuity intact bilaterally,  visual fields full to confrontation, pupils equal round and reactive to light [Cranial Nerves Oculomotor (III)] : extraocular motion intact [Cranial Nerves Trigeminal (V)] : facial sensation intact symmetrically [Cranial Nerves Facial (VII)] : face symmetrical [Cranial Nerves Vestibulocochlear (VIII)] : hearing was intact bilaterally [Cranial Nerves Accessory (XI - Cranial And Spinal)] : head turning and shoulder shrug symmetric [Cranial Nerves Hypoglossal (XII)] : there was no tongue deviation with protrusion [Motor Tone] : muscle tone was normal in all four extremities [Motor Strength] : muscle strength was normal in all four extremities [Motor Handedness Right-Handed] : the patient is right hand dominant [Sensation Tactile Decrease] : light touch was intact [2+] : Ankle jerk left 2+ [FreeTextEntry7] : Point tenderness in occipital region of the scalp and posterior neck  [FreeTextEntry8] : On HINTS, head impulse test induced vertigo. No nystagmus noted on exam.

## 2025-07-17 NOTE — ASSESSMENT
DISCOMFORT IN ABD [FreeTextEntry1] : Impression:  -Vertiginous symptoms reproducible with head impulse test, likely peripheral etiology. Unlikely due to small vessel disease given no remote infarct noted in the lizzeth or centers of the vestibularcochlear system. Previous MRI with periventricular hyperintensities likely chronic microvascular changes.   -Neck pain with electrical sensation with tenderness to light touch in posterior neck likely peripheral nerve dysfunction with a component of occipital neuralgia.   # Vertigo - MRI brain with IAC - Referral for vestibular therapy  # Neck pain - Referral for physical therapy - Adjusted gabapentin dosing to 100 mg TID for better coverage while avoiding lethargy   RTC in 4 to 6 weeks

## 2025-07-17 NOTE — HISTORY OF PRESENT ILLNESS
[FreeTextEntry1] : 76 yo M with a PMH of obesity, HTN, HLD, CAD with prior stents in 2019, Afib (on Eliquis), PPM (tachy-geni syndrome), DM, BPH, pancreatitis, COPD (denies use of supplemental oxygen) presents with chronic vertiginous symptoms. He complains of chronic dizziness & vertigo for 3-4 years that is constant and almost "never" remits. He has tried meclizine for it in the past with no imporvement. Patient saw Neurology in 2021 for worsening ataxia (w/ fall) & MRI Head w/wo IV contrast at that time with T2 signals in the right lizzeth as well as in the periventricular structures. Patient also saw ENT 06/2021 for hearing loss and vertigo and patient became dizzy without nystagmus during eval and sent to Dr. Christensen (Neurologist) for central vestibulopathy eval. CT scan of head and CT angio with no large vessel occlusion but patient hx of DM, smoking, heart disease, and HTN with small vessel ischemic changes on imaging. Patient also complains of right frontal electrical sensation that radiates to the back of his neck with point muscular tenderness in the posterior neck. Most recent A1c is 6.8. Also notes shaking of extremities that is more prominent with activity.

## 2025-07-17 NOTE — END OF VISIT
[] : Resident [Time Spent: ___ minutes] : I have spent [unfilled] minutes of time on the encounter which excludes teaching and separately reported services. [FreeTextEntry3] : 79 yo man w. hx of CAD, uncontrolled DM, HTN, here for evaluation of chronic vertigo/dizziness, tremors, memory concerns and head pains.  # Chronic vertigo- constant, worsens with positions. MR brain 2021 w/ MV ischemic changes. Likely peripheral vertigo (PPPD?), less likely dysautonomia. Low suspicion for central process. MRI brain and IAC.  # Tremors- likely essentials tremors. No parkinsonism on exam. Will monitor clinically for now.  # R sided head pains- likely occipital neuralgia. Will prescribe gabapentin 100 mg TID. Check ESR and cRP. Low suspicion for Temporal arteritis. Will refer for PT for cervicalgia.  # Memory concerns- likely vascular cognitive impairment. Will monitor. Repeat MRI brain.

## 2025-07-17 NOTE — HISTORY OF PRESENT ILLNESS
[FreeTextEntry1] : 78 yo M with a PMH of obesity, HTN, HLD, CAD with prior stents in 2019, Afib (on Eliquis), PPM (tachy-geni syndrome), DM, BPH, pancreatitis, COPD (denies use of supplemental oxygen) presents with chronic vertiginous symptoms. He complains of chronic dizziness & vertigo for 3-4 years that is constant and almost "never" remits. He has tried meclizine for it in the past with no imporvement. Patient saw Neurology in 2021 for worsening ataxia (w/ fall) & MRI Head w/wo IV contrast at that time with T2 signals in the right lizzeth as well as in the periventricular structures. Patient also saw ENT 06/2021 for hearing loss and vertigo and patient became dizzy without nystagmus during eval and sent to Dr. Christensen (Neurologist) for central vestibulopathy eval. CT scan of head and CT angio with no large vessel occlusion but patient hx of DM, smoking, heart disease, and HTN with small vessel ischemic changes on imaging. Patient also complains of right frontal electrical sensation that radiates to the back of his neck with point muscular tenderness in the posterior neck. Most recent A1c is 6.8. Also notes shaking of extremities that is more prominent with activity.